# Patient Record
Sex: MALE | Race: BLACK OR AFRICAN AMERICAN
[De-identification: names, ages, dates, MRNs, and addresses within clinical notes are randomized per-mention and may not be internally consistent; named-entity substitution may affect disease eponyms.]

---

## 2021-03-01 NOTE — CT
CT BRAIN WITHOUT CONTRAST:



HISTORY: Concussion. Dizziness with nausea and vomiting



COMPARISON: 5/1/2020



FINDINGS:

No evidence of acute infarct, hemorrhage, midline shift or abnormal extra-axial fluid collections is 
seen. The ventricular size is appropriate and the basilar cisterns are patent. The bony calvarium

is intact. The visualized paranasal sinuses and mastoid air cells are well aerated.



IMPRESSION: No CT evidence of acute intracranial process.



Reported By: Luther Calderón 

Electronically Signed:  3/1/2021 6:57 PM

## 2021-05-22 ENCOUNTER — HOSPITAL ENCOUNTER (EMERGENCY)
Dept: HOSPITAL 92 - ERS | Age: 36
Discharge: HOME | End: 2021-05-22
Payer: SELF-PAY

## 2021-05-22 DIAGNOSIS — I10: ICD-10-CM

## 2021-05-22 DIAGNOSIS — F17.210: ICD-10-CM

## 2021-05-22 DIAGNOSIS — S86.011A: Primary | ICD-10-CM
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
## 2021-05-22 PROCEDURE — 29515 APPLICATION SHORT LEG SPLINT: CPT

## 2021-05-25 ENCOUNTER — HOSPITAL ENCOUNTER (OUTPATIENT)
Dept: HOSPITAL 92 - LABBT | Age: 36
Discharge: HOME | End: 2021-05-25
Attending: ORTHOPAEDIC SURGERY
Payer: SELF-PAY

## 2021-05-25 DIAGNOSIS — S86.011A: ICD-10-CM

## 2021-05-25 DIAGNOSIS — Z01.812: Primary | ICD-10-CM

## 2021-05-25 DIAGNOSIS — Z20.822: ICD-10-CM

## 2021-05-25 LAB
BASOPHILS # BLD AUTO: 0 10X3/UL (ref 0–0.2)
BASOPHILS NFR BLD AUTO: 0.5 % (ref 0–2)
EOSINOPHIL # BLD AUTO: 0.2 10X3/UL (ref 0–0.5)
EOSINOPHIL NFR BLD AUTO: 2.3 % (ref 0–6)
HGB BLD-MCNC: 15.3 G/DL (ref 13.5–17.5)
LYMPHOCYTES NFR BLD AUTO: 29.5 % (ref 18–47)
MCH RBC QN AUTO: 29.6 PG (ref 27–33)
MCV RBC AUTO: 86.3 FL (ref 81.2–95.1)
MONOCYTES # BLD AUTO: 0.4 10X3/UL (ref 0–1.1)
MONOCYTES NFR BLD AUTO: 5.2 % (ref 0–10)
NEUTROPHILS # BLD AUTO: 4.9 10X3/UL (ref 1.5–8.4)
NEUTROPHILS NFR BLD AUTO: 62.2 % (ref 40–75)
PLATELET # BLD AUTO: 325 10X3/UL (ref 150–450)
RBC # BLD AUTO: 5.17 10X6/UL (ref 4.32–5.72)
WBC # BLD AUTO: 7.8 10X3/UL (ref 3.5–10.5)

## 2021-05-25 PROCEDURE — 85025 COMPLETE CBC W/AUTO DIFF WBC: CPT

## 2021-05-25 PROCEDURE — U0005 INFEC AGEN DETEC AMPLI PROBE: HCPCS

## 2021-05-25 PROCEDURE — 87635 SARS-COV-2 COVID-19 AMP PRB: CPT

## 2021-05-25 PROCEDURE — U0003 INFECTIOUS AGENT DETECTION BY NUCLEIC ACID (DNA OR RNA); SEVERE ACUTE RESPIRATORY SYNDROME CORONAVIRUS 2 (SARS-COV-2) (CORONAVIRUS DISEASE [COVID-19]), AMPLIFIED PROBE TECHNIQUE, MAKING USE OF HIGH THROUGHPUT TECHNOLOGIES AS DESCRIBED BY CMS-2020-01-R: HCPCS

## 2021-05-28 ENCOUNTER — HOSPITAL ENCOUNTER (OUTPATIENT)
Dept: HOSPITAL 92 - SDC | Age: 36
Discharge: HOME | End: 2021-05-28
Attending: ORTHOPAEDIC SURGERY
Payer: SELF-PAY

## 2021-05-28 VITALS — BODY MASS INDEX: 30.4 KG/M2

## 2021-05-28 DIAGNOSIS — S86.011A: Primary | ICD-10-CM

## 2021-05-28 DIAGNOSIS — G89.18: ICD-10-CM

## 2021-05-28 DIAGNOSIS — F17.200: ICD-10-CM

## 2021-05-28 DIAGNOSIS — I10: ICD-10-CM
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
## 2021-05-28 DIAGNOSIS — Z79.899: ICD-10-CM

## 2021-05-28 PROCEDURE — A4306 DRUG DELIVERY SYSTEM <=50 ML: HCPCS

## 2021-05-28 PROCEDURE — 3E0T3BZ INTRODUCTION OF ANESTHETIC AGENT INTO PERIPHERAL NERVES AND PLEXI, PERCUTANEOUS APPROACH: ICD-10-PCS | Performed by: ORTHOPAEDIC SURGERY

## 2021-05-28 PROCEDURE — 0LQN0ZZ REPAIR RIGHT LOWER LEG TENDON, OPEN APPROACH: ICD-10-PCS | Performed by: ORTHOPAEDIC SURGERY

## 2021-05-28 PROCEDURE — S0020 INJECTION, BUPIVICAINE HYDRO: HCPCS
